# Patient Record
Sex: FEMALE | Race: OTHER | HISPANIC OR LATINO | Employment: UNEMPLOYED | URBAN - METROPOLITAN AREA
[De-identification: names, ages, dates, MRNs, and addresses within clinical notes are randomized per-mention and may not be internally consistent; named-entity substitution may affect disease eponyms.]

---

## 2024-07-22 ENCOUNTER — OFFICE VISIT (OUTPATIENT)
Age: 54
End: 2024-07-22

## 2024-07-22 VITALS
SYSTOLIC BLOOD PRESSURE: 130 MMHG | WEIGHT: 148 LBS | HEIGHT: 64 IN | DIASTOLIC BLOOD PRESSURE: 87 MMHG | TEMPERATURE: 97.3 F | BODY MASS INDEX: 25.27 KG/M2 | OXYGEN SATURATION: 99 % | HEART RATE: 83 BPM

## 2024-07-22 DIAGNOSIS — E78.00 HYPERCHOLESTEROLEMIA: ICD-10-CM

## 2024-07-22 DIAGNOSIS — R10.9 ABDOMINAL PAIN, UNSPECIFIED ABDOMINAL LOCATION: ICD-10-CM

## 2024-07-22 DIAGNOSIS — Z79.60 LONG-TERM USE OF IMMUNOSUPPRESSANT MEDICATION: ICD-10-CM

## 2024-07-22 DIAGNOSIS — R11.0 NAUSEA: ICD-10-CM

## 2024-07-22 DIAGNOSIS — Z86.19 HEPATITIS C VIRUS INFECTION CURED AFTER ANTIVIRAL DRUG THERAPY: ICD-10-CM

## 2024-07-22 DIAGNOSIS — R19.7 DIARRHEA, UNSPECIFIED TYPE: ICD-10-CM

## 2024-07-22 DIAGNOSIS — Z87.898 H/O INTRAVENOUS DRUG USE IN REMISSION: ICD-10-CM

## 2024-07-22 DIAGNOSIS — Z94.4 LIVER TRANSPLANT RECIPIENT (HCC): Primary | ICD-10-CM

## 2024-07-22 RX ORDER — SERTRALINE HYDROCHLORIDE 20 MG/ML
25 SOLUTION ORAL DAILY
COMMUNITY

## 2024-07-22 RX ORDER — ATORVASTATIN CALCIUM 10 MG/1
10 TABLET, FILM COATED ORAL DAILY
COMMUNITY

## 2024-07-22 RX ORDER — BUPRENORPHINE AND NALOXONE 8; 2 MG/1; MG/1
1 FILM, SOLUBLE BUCCAL; SUBLINGUAL DAILY
COMMUNITY

## 2024-07-22 RX ORDER — LISINOPRIL 10 MG/1
10 TABLET ORAL DAILY
COMMUNITY

## 2024-07-22 RX ORDER — PREDNISONE 5 MG/1
5 TABLET ORAL DAILY
COMMUNITY

## 2024-07-22 RX ORDER — MYCOPHENOLATE MOFETIL 500 MG/1
TABLET ORAL 2 TIMES DAILY
COMMUNITY

## 2024-07-22 RX ORDER — BUPRENORPHINE AND NALOXONE 4; 1 MG/1; MG/1
1 FILM, SOLUBLE BUCCAL; SUBLINGUAL DAILY
COMMUNITY

## 2024-07-22 RX ORDER — TACROLIMUS 1 MG/1
1 CAPSULE ORAL 2 TIMES DAILY
COMMUNITY

## 2024-07-22 NOTE — PROGRESS NOTES
40 mg, Oral, DAILY    polyethylene glycol (GLYCOLAX) 17 GM/SCOOP powder Take as a split dose as directed.    predniSONE (DELTASONE) 5 mg, Oral, DAILY    sertraline (ZOLOFT) 25 mg, Oral, DAILY    sodium-potassium-mag sulfate (SUPREP) 17.5-3.13-1.6 GM/177ML SOLN solution Follow directions provided by physician's office.    tacrolimus (PROGRAF) 1 mg, Oral, 2 TIMES DAILY    valACYclovir (VALTREX) 1,000 mg, Oral, PRN       SYSTEM REVIEW NOT RELATED TO LIVER DISEASE OR REVIEWED ABOVE:  Constitution systems: Negative for fever, chills, weight gain, weight loss.   Eyes: Negative for visual changes.  ENT: Negative for sore throat, painful swallowing.   Respiratory: Negative for cough, hemoptysis, SOB.   Cardiology: Negative for chest pain, palpitations.  GI:  Negative for constipation or diarrhea.  : Negative for urinary frequency, dysuria, hematuria, nocturia.   Skin: Negative for rash.  Hematology: Negative for easy bruising, blood clots.    Musculo-skelatal: Negative for back pain, muscle pain, weakness.  Neurologic: Negative for headaches, dizziness, vertigo, memory problems not related to HE.  Psychology: Negative for anxiety, depression.       FAMILY HISTORY:  There is no family history of liver disease.      SOCIAL HISTORY:  The patient is .    The patient has children and grandchildren  The patient stopped using tobacco products in 2010.    The patient has never consumed significant amounts of alcohol.    The patient does not work outside the home.        PHYSICAL EXAMINATION:  /87 (Site: Left Upper Arm, Position: Sitting, Cuff Size: Medium Adult)   Pulse 83   Temp 97.3 °F (36.3 °C)   Ht 1.626 m (5' 4\")   Wt 67.1 kg (148 lb)   SpO2 99%   BMI 25.40 kg/m²     General: No acute distress.   Eyes: Sclera anicteric.   ENT: No oral lesions.  Thyroid normal.  Nodes: No adenopathy.   Skin: No spider angiomata.  No jaundice.  No palmar erythema.  Respiratory: Lungs clear to auscultation.   Cardiovascular:

## 2024-07-23 ENCOUNTER — HOSPITAL ENCOUNTER (OUTPATIENT)
Facility: HOSPITAL | Age: 54
Setting detail: SPECIMEN
Discharge: HOME | End: 2024-07-26
Payer: COMMERCIAL

## 2024-07-23 ENCOUNTER — PREP FOR PROCEDURE (OUTPATIENT)
Age: 54
End: 2024-07-23

## 2024-07-23 ENCOUNTER — TELEPHONE (OUTPATIENT)
Age: 54
End: 2024-07-23

## 2024-07-23 DIAGNOSIS — Z94.4 LIVER TRANSPLANT RECIPIENT (HCC): Primary | ICD-10-CM

## 2024-07-23 DIAGNOSIS — Z94.4 LIVER TRANSPLANT RECIPIENT (HCC): ICD-10-CM

## 2024-07-23 DIAGNOSIS — R19.7 DIARRHEA, UNSPECIFIED TYPE: ICD-10-CM

## 2024-07-23 DIAGNOSIS — R11.0 NAUSEA: ICD-10-CM

## 2024-07-23 DIAGNOSIS — R10.9 ABDOMINAL PAIN, UNSPECIFIED ABDOMINAL LOCATION: ICD-10-CM

## 2024-07-23 PROCEDURE — 80197 ASSAY OF TACROLIMUS: CPT

## 2024-07-23 PROCEDURE — 82378 CARCINOEMBRYONIC ANTIGEN: CPT

## 2024-07-23 PROCEDURE — 87522 HEPATITIS C REVRS TRNSCRPJ: CPT

## 2024-07-23 PROCEDURE — 36415 COLL VENOUS BLD VENIPUNCTURE: CPT

## 2024-07-23 PROCEDURE — 86301 IMMUNOASSAY TUMOR CA 19-9: CPT

## 2024-07-23 PROCEDURE — 43235 EGD DIAGNOSTIC BRUSH WASH: CPT | Performed by: INTERNAL MEDICINE

## 2024-07-24 LAB — CEA SERPL-MCNC: 0.4 NG/ML

## 2024-07-26 DIAGNOSIS — R10.9 ABDOMINAL PAIN, UNSPECIFIED ABDOMINAL LOCATION: ICD-10-CM

## 2024-07-26 DIAGNOSIS — R11.0 NAUSEA: ICD-10-CM

## 2024-07-26 DIAGNOSIS — Z94.4 LIVER TRANSPLANT RECIPIENT (HCC): ICD-10-CM

## 2024-07-26 DIAGNOSIS — R19.7 DIARRHEA, UNSPECIFIED TYPE: ICD-10-CM

## 2024-07-26 LAB
CANCER AG19-9 SERPL-ACNC: 11 U/ML (ref 0–35)
HCV RNA SERPL NAA+PROBE-LOG IU: NOT DETECTED HCV LOG 10IU/ML
HCV RNA SERPL PROBE AMP-ACNC: NOT DETECTED HCVIU/ML
TACROLIMUS BLD-MCNC: 9.7 NG/ML (ref 2–20)

## 2024-07-29 ENCOUNTER — ANESTHESIA (OUTPATIENT)
Facility: HOSPITAL | Age: 54
End: 2024-07-29
Payer: COMMERCIAL

## 2024-07-29 ENCOUNTER — HOSPITAL ENCOUNTER (OUTPATIENT)
Facility: HOSPITAL | Age: 54
Setting detail: OUTPATIENT SURGERY
Discharge: HOME | End: 2024-07-29
Attending: INTERNAL MEDICINE | Admitting: INTERNAL MEDICINE
Payer: COMMERCIAL

## 2024-07-29 ENCOUNTER — ANESTHESIA EVENT (OUTPATIENT)
Facility: HOSPITAL | Age: 54
End: 2024-07-29
Payer: COMMERCIAL

## 2024-07-29 VITALS
WEIGHT: 151.1 LBS | SYSTOLIC BLOOD PRESSURE: 120 MMHG | HEIGHT: 64 IN | DIASTOLIC BLOOD PRESSURE: 85 MMHG | TEMPERATURE: 97.7 F | RESPIRATION RATE: 18 BRPM | BODY MASS INDEX: 25.8 KG/M2 | HEART RATE: 68 BPM | OXYGEN SATURATION: 100 %

## 2024-07-29 LAB
GLUCOSE BLD STRIP.AUTO-MCNC: 118 MG/DL (ref 70–110)
HCG UR QL: NEGATIVE

## 2024-07-29 PROCEDURE — 81025 URINE PREGNANCY TEST: CPT

## 2024-07-29 PROCEDURE — 7100000010 HC PHASE II RECOVERY - FIRST 15 MIN: Performed by: INTERNAL MEDICINE

## 2024-07-29 PROCEDURE — 7100000011 HC PHASE II RECOVERY - ADDTL 15 MIN: Performed by: INTERNAL MEDICINE

## 2024-07-29 PROCEDURE — 43235 EGD DIAGNOSTIC BRUSH WASH: CPT | Performed by: INTERNAL MEDICINE

## 2024-07-29 PROCEDURE — 2580000003 HC RX 258: Performed by: INTERNAL MEDICINE

## 2024-07-29 PROCEDURE — 6360000002 HC RX W HCPCS: Performed by: NURSE ANESTHETIST, CERTIFIED REGISTERED

## 2024-07-29 PROCEDURE — 3700000001 HC ADD 15 MINUTES (ANESTHESIA): Performed by: INTERNAL MEDICINE

## 2024-07-29 PROCEDURE — 2709999900 HC NON-CHARGEABLE SUPPLY: Performed by: INTERNAL MEDICINE

## 2024-07-29 PROCEDURE — 3600007512: Performed by: INTERNAL MEDICINE

## 2024-07-29 PROCEDURE — 82962 GLUCOSE BLOOD TEST: CPT

## 2024-07-29 PROCEDURE — 3700000000 HC ANESTHESIA ATTENDED CARE: Performed by: INTERNAL MEDICINE

## 2024-07-29 PROCEDURE — 3600007502: Performed by: INTERNAL MEDICINE

## 2024-07-29 PROCEDURE — 2500000003 HC RX 250 WO HCPCS: Performed by: NURSE ANESTHETIST, CERTIFIED REGISTERED

## 2024-07-29 RX ORDER — LIDOCAINE HYDROCHLORIDE 20 MG/ML
INJECTION, SOLUTION EPIDURAL; INFILTRATION; INTRACAUDAL; PERINEURAL PRN
Status: DISCONTINUED | OUTPATIENT
Start: 2024-07-29 | End: 2024-07-29 | Stop reason: SDUPTHER

## 2024-07-29 RX ORDER — SODIUM CHLORIDE 9 MG/ML
INJECTION, SOLUTION INTRAVENOUS CONTINUOUS
Status: DISCONTINUED | OUTPATIENT
Start: 2024-07-29 | End: 2024-07-29 | Stop reason: HOSPADM

## 2024-07-29 RX ORDER — PROPOFOL 10 MG/ML
INJECTION, EMULSION INTRAVENOUS PRN
Status: DISCONTINUED | OUTPATIENT
Start: 2024-07-29 | End: 2024-07-29 | Stop reason: SDUPTHER

## 2024-07-29 RX ADMIN — PROPOFOL 50 MG: 10 INJECTION, EMULSION INTRAVENOUS at 11:32

## 2024-07-29 RX ADMIN — SODIUM CHLORIDE: 9 INJECTION, SOLUTION INTRAVENOUS at 11:15

## 2024-07-29 RX ADMIN — LIDOCAINE HYDROCHLORIDE 60 MG: 20 INJECTION, SOLUTION EPIDURAL; INFILTRATION; INTRACAUDAL; PERINEURAL at 11:29

## 2024-07-29 RX ADMIN — PROPOFOL 50 MG: 10 INJECTION, EMULSION INTRAVENOUS at 11:31

## 2024-07-29 RX ADMIN — PROPOFOL 60 MG: 10 INJECTION, EMULSION INTRAVENOUS at 11:29

## 2024-07-29 RX ADMIN — PROPOFOL 40 MG: 10 INJECTION, EMULSION INTRAVENOUS at 11:30

## 2024-07-29 ASSESSMENT — PAIN - FUNCTIONAL ASSESSMENT: PAIN_FUNCTIONAL_ASSESSMENT: 0-10

## 2024-07-29 NOTE — H&P
[facility]      Jerry Larios MD, FACP, FACG, FAASLD      MELISSA Adkins, [facility]   Helena Valentinrobin, [facility]   Griseldamo Cotter, [facility]  Mahendra Kay, [facility]   Shabnam Maciel, [facility]   Flower Aston, [facility]   [address], [address]   [place]  [address] [phone]   FAX: [phone]  [facility]   [address], [address]   [place]  [address] [phone]   FAX: [phone]         PRE-PROCEDURE NOTE - EGD    H and P from last office visit reviewed.    Allergies reviewed.  Out-patient medicaton list reviewed.    Patient Active Problem List   Diagnosis    Liver transplant recipient (HCC)         No Known Allergies    No current facility-administered medications on file prior to encounter.     Current Outpatient Medications on File Prior to Encounter   Medication Sig Dispense Refill    predniSONE (DELTASONE) 5 MG tablet Take 1 tablet by mouth daily      lisinopril (PRINIVIL;ZESTRIL) 10 MG tablet Take 1 tablet by mouth daily      buprenorphine-naloxone (SUBOXONE) 8-2 MG FILM SL film Place 1 Film under the tongue daily. Max Daily Amount: 1 Film      buprenorphine-naloxone (SUBOXONE) 4-1 MG FILM SL film Place 1 Film under the tongue daily. Max Daily Amount: 1 Film      atorvastatin (LIPITOR) 10 MG tablet Take 1 tablet by mouth daily      tacrolimus (PROGRAF) 1 MG capsule Take 1 capsule by mouth 2 times daily      sertraline (ZOLOFT) 20 MG/ML concentrated solution Take 1.25 mLs by mouth daily      naloxone 4 MG/0.1ML LIQD nasal spray 1 spray by Nasal route as needed for Opioid Reversal      mycophenolate (CELLCEPT) 500 MG tablet Take by mouth 2 times daily         For EGD to assess for etiology of nausea and abdominal pain.        PHYSICAL EXAMINATION:  Vital

## 2024-07-29 NOTE — DISCHARGE INSTRUCTIONS
[facility]      Jerry Larios MD, FACP, FACG, FAASLD      MELISSA Adkins, [facility]   Helena Valentinadesharona, [facility]   Griselda Vahe, [facility]  Mahendra Kay, [facility]   Shabnam Maciel, [facility]   Flower Sorianoon, [facility]   [address], [address]   [place]  [address] [phone]   FAX: [phone]  [facility]   [address], [address]   [place]  [address] [phone]   FAX: [phone]         ENDOSCOPY DISCHARGE INSTRUCTIONS      Nohemy Elena Rodriguez  1970  Date: 7/29/2024    DISCOMFORT:  Use lozenges or warm salt water gargle for sore thoat  Apply warm compress to IV site if red.  If redness or soreness persists call the office.  You may experience gas and bloating.  Walking and belching will help relieve this.  You may experience chest discomfort or pressure especially if banding of esophageal varices was performed.    DIET:  You may resume your normal diet.    ACTIVITY:  Spend the remainder of the day resting.  Avoid any strenuous activity.  You may not operate a vehicle for 12 hours.  You may not engage in an occupation involving machinery or appliances for rest of today.   Avoid making any critical or financial decisions for at least 24 hour.    Call the Liver [facility] Office if you have any of the following:  Increasing chest or abdominal pain, nausea, vomiting, vomiting blood, abdominal distension or swelling, fever or chills, bloody discharge from nose or mouth or shortness of breath.    Follow-up Instructions:  Call Dr. Larios for any questions or problems at the phone number listed above.  If a biopsy was performed, you will be contacted by the office staff or Dr Larios within 1 week.   If you have not heard from us by then you may

## 2024-07-29 NOTE — PERIOP NOTE
TRANSFER - IN REPORT:    Verbal report received from Cari persaud on Nohemy Rodriguez  being received from PROCEDURE ROOM for routine progression of patient care      Report consisted of patient's Situation, Background, Assessment and   Recommendations(SBAR).     Information from the following report(s) Nurse Handoff Report, Intake/Output, MAR, Recent Results, and Med Rec Status was reviewed with the receiving nurse.    Opportunity for questions and clarification was provided.      Assessment completed upon patient's arrival to unit and care assumed.

## 2024-07-29 NOTE — OP NOTE
tolerated the procedure well.    EBL: Negligible.           RECOMMENDATIONS:  Observe until discharge parameters are achieved.  May resume previous diet.  Repeat endoscopy to reassess varices and need for banding in 3 years.  Follow-up Liver [facility] office as scheduled.      Jerry Larios MD  [address], [address]  [place]  [address] [phone]  [facility]

## 2024-07-29 NOTE — ANESTHESIA PRE PROCEDURE
\"CREATININE\", \"GFRAA\", \"AGRATIO\", \"LABGLOM\", \"GLUCOSE\", \"GLU\", \"CALCIUM\", \"BILITOT\", \"ALKPHOS\", \"AST\", \"ALT\"    POC Tests: No results for input(s): \"POCGLU\", \"POCNA\", \"POCK\", \"POCCL\", \"POCBUN\", \"POCHEMO\", \"POCHCT\" in the last 72 hours.    Coags: No results found for: \"PROTIME\", \"INR\", \"APTT\"    HCG (If Applicable): No results found for: \"PREGTESTUR\", \"PREGSERUM\", \"HCG\", \"HCGQUANT\"     ABGs: No results found for: \"PHART\", \"PO2ART\", \"TVD8QEP\", \"XQK8GDN\", \"BEART\", \"Q0KFPDOL\"     Type & Screen (If Applicable):  No results found for: \"LABABO\"    Drug/Infectious Status (If Applicable):  No results found for: \"HIV\", \"HEPCAB\"    COVID-19 Screening (If Applicable): No results found for: \"COVID19\"        Anesthesia Evaluation  Patient summary reviewed and Nursing notes reviewed  Airway: Mallampati: I  TM distance: >3 FB   Neck ROM: full  Mouth opening: > = 3 FB   Dental:    (+) upper dentures and partials      Pulmonary:Negative Pulmonary ROS and normal exam  breath sounds clear to auscultation                             Cardiovascular:  Exercise tolerance: good (>4 METS)  (+) hypertension: no interval change        Rhythm: regular  Rate: normal           Beta Blocker:  Not on Beta Blocker         Neuro/Psych:   Negative Neuro/Psych ROS              GI/Hepatic/Renal:   (+) liver disease:         ROS comment: Cirrhosis. S/p liver transplant.   Endo/Other:    (+) DiabetesType II DM, well controlled.                  ROS comment: Hx of heroin abuse, clean 22 years, on suboxone last dose this morning Abdominal: normal exam            Vascular: negative vascular ROS.         Other Findings:             Anesthesia Plan      MAC     ASA 3         continuous noninvasive hemodynamic monitor    Anesthetic plan and risks discussed with patient.      Plan discussed with CRNA.                    Reinaldo Kim MD   7/29/2024

## 2024-07-30 NOTE — ANESTHESIA POSTPROCEDURE EVALUATION
Department of Anesthesiology  Postprocedure Note    Patient: Nohemy Rodriguez  MRN: [id number]  YOB: 1970  Date of evaluation: 7/30/2024    Procedure Summary       Date: 07/29/24 Room / Location: [place] [address] / [facility] ENDOSCOPY    Anesthesia Start: 1123 Anesthesia Stop: 1136    Procedure: ESOPHAGOGASTRODUODENOSCOPY (Upper GI Region) Diagnosis:       Liver transplant recipient (HCC)      (Liver transplant recipient (HCC) [Z94.4])    Surgeons: Jerry Larios MD Responsible Provider: Reinaldo Kim MD    Anesthesia Type: MAC ASA Status: 3            Anesthesia Type: MAC    Lg Phase I: Lg Score: 10    Lg Phase II: Lg Score: 10    Anesthesia Post Evaluation    Patient location during evaluation: PACU  Patient participation: complete - patient participated  Level of consciousness: awake and alert  Airway patency: patent  Nausea & Vomiting: no nausea and no vomiting  Cardiovascular status: blood pressure returned to baseline and hemodynamically stable  Respiratory status: spontaneous ventilation, room air and nonlabored ventilation  Hydration status: stable  Pain management: adequate and satisfactory to patient        No notable events documented.

## 2024-08-05 ENCOUNTER — HOSPITAL ENCOUNTER (OUTPATIENT)
Facility: HOSPITAL | Age: 54
Discharge: HOME | End: 2024-08-08
Attending: INTERNAL MEDICINE
Payer: COMMERCIAL

## 2024-08-05 ENCOUNTER — HOSPITAL ENCOUNTER (OUTPATIENT)
Facility: HOSPITAL | Age: 54
Setting detail: SPECIMEN
Discharge: HOME | End: 2024-08-08
Payer: COMMERCIAL

## 2024-08-05 DIAGNOSIS — R10.9 ABDOMINAL PAIN, UNSPECIFIED ABDOMINAL LOCATION: ICD-10-CM

## 2024-08-05 DIAGNOSIS — R19.7 DIARRHEA, UNSPECIFIED TYPE: ICD-10-CM

## 2024-08-05 DIAGNOSIS — Z94.4 LIVER TRANSPLANT RECIPIENT (HCC): ICD-10-CM

## 2024-08-05 DIAGNOSIS — R11.0 NAUSEA: ICD-10-CM

## 2024-08-05 LAB
ALBUMIN SERPL-MCNC: 4 G/DL (ref 3.4–5)
ALBUMIN/GLOB SERPL: 1.2 (ref 0.8–1.7)
ALP SERPL-CCNC: 61 U/L (ref 45–117)
ALT SERPL-CCNC: 34 U/L (ref 13–56)
ANION GAP SERPL CALC-SCNC: 6 MMOL/L (ref 3–18)
AST SERPL-CCNC: 27 U/L (ref 10–38)
BASOPHILS # BLD: 0 K/UL (ref 0–0.1)
BASOPHILS NFR BLD: 0 % (ref 0–2)
BILIRUB DIRECT SERPL-MCNC: 0.3 MG/DL (ref 0–0.2)
BILIRUB SERPL-MCNC: 0.8 MG/DL (ref 0.2–1)
BUN SERPL-MCNC: 11 MG/DL (ref 7–18)
BUN/CREAT SERPL: 19 (ref 12–20)
CALCIUM SERPL-MCNC: 9.6 MG/DL (ref 8.5–10.1)
CHLORIDE SERPL-SCNC: 105 MMOL/L (ref 100–111)
CO2 SERPL-SCNC: 26 MMOL/L (ref 21–32)
CREAT SERPL-MCNC: 0.58 MG/DL (ref 0.6–1.3)
DIFFERENTIAL METHOD BLD: ABNORMAL
EOSINOPHIL # BLD: 0 K/UL (ref 0–0.4)
EOSINOPHIL NFR BLD: 0 % (ref 0–5)
ERYTHROCYTE [DISTWIDTH] IN BLOOD BY AUTOMATED COUNT: 12 % (ref 11.6–14.5)
GLOBULIN SER CALC-MCNC: 3.3 G/DL (ref 2–4)
GLUCOSE SERPL-MCNC: 123 MG/DL (ref 74–99)
HCT VFR BLD AUTO: 33.6 % (ref 35–45)
HGB BLD-MCNC: 11.7 G/DL (ref 12–16)
IMM GRANULOCYTES # BLD AUTO: 0 K/UL (ref 0–0.04)
IMM GRANULOCYTES NFR BLD AUTO: 1 % (ref 0–0.5)
LYMPHOCYTES # BLD: 1.2 K/UL (ref 0.9–3.6)
LYMPHOCYTES NFR BLD: 14 % (ref 21–52)
MAGNESIUM SERPL-MCNC: 1.7 MG/DL (ref 1.6–2.6)
MCH RBC QN AUTO: 31.1 PG (ref 24–34)
MCHC RBC AUTO-ENTMCNC: 34.8 G/DL (ref 31–37)
MCV RBC AUTO: 89.4 FL (ref 78–100)
MONOCYTES # BLD: 0.3 K/UL (ref 0.05–1.2)
MONOCYTES NFR BLD: 3 % (ref 3–10)
NEUTS SEG # BLD: 7 K/UL (ref 1.8–8)
NEUTS SEG NFR BLD: 82 % (ref 40–73)
NRBC # BLD: 0 K/UL (ref 0–0.01)
NRBC BLD-RTO: 0 PER 100 WBC
PLATELET # BLD AUTO: 268 K/UL (ref 135–420)
PMV BLD AUTO: 10.7 FL (ref 9.2–11.8)
POTASSIUM SERPL-SCNC: 4.4 MMOL/L (ref 3.5–5.5)
PROT SERPL-MCNC: 7.3 G/DL (ref 6.4–8.2)
RBC # BLD AUTO: 3.76 M/UL (ref 4.2–5.3)
SODIUM SERPL-SCNC: 137 MMOL/L (ref 136–145)
WBC # BLD AUTO: 8.6 K/UL (ref 4.6–13.2)

## 2024-08-05 PROCEDURE — 80197 ASSAY OF TACROLIMUS: CPT

## 2024-08-05 PROCEDURE — 71260 CT THORAX DX C+: CPT

## 2024-08-05 PROCEDURE — 6360000004 HC RX CONTRAST MEDICATION: Performed by: INTERNAL MEDICINE

## 2024-08-05 PROCEDURE — 80076 HEPATIC FUNCTION PANEL: CPT

## 2024-08-05 PROCEDURE — 85025 COMPLETE CBC W/AUTO DIFF WBC: CPT

## 2024-08-05 PROCEDURE — 74178 CT ABD&PLV WO CNTR FLWD CNTR: CPT

## 2024-08-05 PROCEDURE — 36415 COLL VENOUS BLD VENIPUNCTURE: CPT

## 2024-08-05 PROCEDURE — 74170 CT ABD WO CNTRST FLWD CNTRST: CPT

## 2024-08-05 PROCEDURE — 83735 ASSAY OF MAGNESIUM: CPT

## 2024-08-05 PROCEDURE — 80048 BASIC METABOLIC PNL TOTAL CA: CPT

## 2024-08-05 RX ADMIN — IOPAMIDOL 100 ML: 612 INJECTION, SOLUTION INTRAVENOUS at 13:12

## 2024-08-07 LAB — TACROLIMUS BLD-MCNC: 11.3 NG/ML (ref 2–20)

## 2024-08-13 RX ORDER — BISACODYL 5 MG/1
TABLET, DELAYED RELEASE ORAL
COMMUNITY
Start: 2024-08-02

## 2024-08-13 RX ORDER — POLYETHYLENE GLYCOL 3350 17 G/17G
POWDER, FOR SOLUTION ORAL
COMMUNITY
Start: 2024-08-02

## 2024-08-14 ENCOUNTER — OFFICE VISIT (OUTPATIENT)
Age: 54
End: 2024-08-14

## 2024-08-14 VITALS
OXYGEN SATURATION: 98 % | WEIGHT: 153 LBS | TEMPERATURE: 98.3 F | HEIGHT: 64 IN | BODY MASS INDEX: 26.12 KG/M2 | HEART RATE: 84 BPM | SYSTOLIC BLOOD PRESSURE: 152 MMHG | DIASTOLIC BLOOD PRESSURE: 97 MMHG

## 2024-08-14 DIAGNOSIS — Z94.4 LIVER TRANSPLANT RECIPIENT (HCC): Primary | ICD-10-CM

## 2024-08-14 NOTE — PROGRESS NOTES
clear to auscultation.   Cardiovascular: Regular heart rate.  No murmurs.  No JVD.  Abdomen: diffuse tenderss.  NO rebound.    Extremities: No edema.    Neurologic: Alert and oriented.  Cranial nerves grossly intact.      LABORATORY STUDIES:   Latest Ref Rn 8/5/2024   JIMENA - Routine Labs     WBC 4.6 - 13.2 K/uL 8.6    ANC 1.8 - 8.0 K/UL 7.0    HGB 12.0 - 16.0 g/dL 11.7 (L)     - 420 K/uL 268    AST 10 - 38 U/L 27    ALT 13 - 56 U/L 34    Alk Phos 45 - 117 U/L 61    Bili, Total 0.2 - 1.0 MG/DL 0.8    Bili, Direct 0.0 - 0.2 MG/DL 0.3 (H)    Albumin 3.4 - 5.0 g/dL 4.0    BUN 7.0 - 18 MG/DL 11    Creat 0.6 - 1.3 MG/DL 0.58 (L)    Na 136 - 145 mmol/L 137    K 3.5 - 5.5 mmol/L 4.4    Cl 100 - 111 mmol/L 105    CO2 21 - 32 mmol/L 26    Glucose 74 - 99 mg/dL 123 (H)    Magnesium 1.6 - 2.6 mg/dL 1.7       Latest Ref Rng 7/23/2024 8/5/2024   JIMENA - Transplant Immune Suppression      Tacrolimus 2.0 - 20.0 ng/mL 9.7  11.3        SEROLOGIES:   Latest Ref [facility] 7/23/2024   JIMENA - Cancer Screening     CA 19-9 0 - 35 U/mL 11    CEA ng/mL 0.4       Latest Ref [facility] 7/23/2024   JIMENA - Serologies     Hep C RNA Donnell Log NOTD HCV log 10IU/Ml Not detected        LIVER HISTOLOGY:  Not available or performed    ENDOSCOPIC PROCEDURES:  7/2024.  EGD by MLS.  Normal    RADIOLOGY:  8/2024.  CT scan chest, abdomen, pelvis with and without IV contrast.  Normal appearing liver.  No liver mass lesions.  Normal spleen.  No ascites.    OTHER TESTING:  Not available or performed    FOLLOW-UP:  All of the issues listed above in the Assessment and Plan were discussed with the patient.  All questions were answered.  The patient expressed a clear understanding of the above.    Laboratory studies should be performed every 1 month for a few times to make sure liver chemistries remain normal in this new setting.  Can then extend monitoring to every 3 months.      Follow-up Liver [facility] 4 weeks to review results of CT scan, labs and to perform

## 2024-08-17 PROBLEM — Z79.60 LONG-TERM USE OF IMMUNOSUPPRESSANT MEDICATION: Status: ACTIVE | Noted: 2024-08-17

## 2024-08-17 PROBLEM — Z87.898 H/O INTRAVENOUS DRUG USE IN REMISSION: Status: ACTIVE | Noted: 2024-08-17

## 2024-08-17 PROBLEM — T86.40 LIVER TRANSPLANT COMPLICATION (HCC): Status: ACTIVE | Noted: 2024-08-17

## 2024-08-17 PROBLEM — I10 HYPERTENSION: Status: ACTIVE | Noted: 2024-08-17

## 2024-08-17 PROBLEM — E78.00 HYPERCHOLESTEROLEMIA: Status: ACTIVE | Noted: 2024-08-17

## 2024-08-17 PROBLEM — Z86.19 HEPATITIS C VIRUS INFECTION CURED AFTER ANTIVIRAL DRUG THERAPY: Status: ACTIVE | Noted: 2024-08-17

## 2024-09-06 ENCOUNTER — TELEPHONE (OUTPATIENT)
Age: 54
End: 2024-09-06

## 2024-09-06 DIAGNOSIS — R11.0 NAUSEA: ICD-10-CM

## 2024-09-06 DIAGNOSIS — Z94.4 LIVER TRANSPLANT RECIPIENT (HCC): Primary | ICD-10-CM

## 2024-09-06 DIAGNOSIS — Z79.60 LONG-TERM USE OF IMMUNOSUPPRESSANT MEDICATION: ICD-10-CM

## 2024-09-06 RX ORDER — ONDANSETRON 4 MG/1
4 TABLET, ORALLY DISINTEGRATING ORAL EVERY 8 HOURS PRN
Qty: 270 TABLET | Refills: 0 | Status: SHIPPED | OUTPATIENT
Start: 2024-09-06 | End: 2024-12-05

## 2024-09-06 RX ORDER — PREDNISONE 2.5 MG/1
2.5 TABLET ORAL DAILY
Qty: 30 TABLET | Refills: 11 | Status: SHIPPED | OUTPATIENT
Start: 2024-09-06 | End: 2025-09-06

## 2024-09-06 NOTE — TELEPHONE ENCOUNTER
Called patient regarding her Tacrolimus dose and Prednisone dose. Patient on Tac 2/2, down from 3/3 and Prednisone 5 mg daily. Patient having labs drawn on Monday, 9/9/24 @ [facility]. Patient still having nausea but intermittently and would appreciate a Rx for ondansetron which was sent.   Informed patient Dr. Larios wants to wean her off the prednisone 5 mg daily to 2.5 mg daily x 2 months, Rx sent.  Requested patient stop the 5 mg prednisone and start the 2.5 mg prednisone tomorrow. Until November 7th. Patient to contact me prior to stopping the prednisone in November.

## 2024-09-11 ENCOUNTER — HOSPITAL ENCOUNTER (OUTPATIENT)
Facility: HOSPITAL | Age: 54
Setting detail: SPECIMEN
Discharge: HOME | End: 2024-09-14
Payer: COMMERCIAL

## 2024-09-11 DIAGNOSIS — Z94.4 LIVER TRANSPLANT RECIPIENT (HCC): ICD-10-CM

## 2024-09-11 PROCEDURE — 80197 ASSAY OF TACROLIMUS: CPT

## 2024-09-11 PROCEDURE — 36415 COLL VENOUS BLD VENIPUNCTURE: CPT

## 2024-09-13 LAB — TACROLIMUS BLD-MCNC: 3.9 NG/ML (ref 2–20)

## 2024-09-24 DIAGNOSIS — Z94.4 LIVER TRANSPLANT RECIPIENT (HCC): ICD-10-CM

## 2024-09-24 DIAGNOSIS — Z79.60 LONG-TERM USE OF IMMUNOSUPPRESSANT MEDICATION: ICD-10-CM

## 2024-09-24 DIAGNOSIS — R11.0 NAUSEA: ICD-10-CM

## 2024-09-24 RX ORDER — PREDNISONE 2.5 MG/1
2.5 TABLET ORAL DAILY
Qty: 30 TABLET | Refills: 1 | Status: SHIPPED | OUTPATIENT
Start: 2024-09-24 | End: 2024-11-23

## 2024-09-24 RX ORDER — TACROLIMUS 1 MG/1
2 CAPSULE ORAL 2 TIMES DAILY
Qty: 360 CAPSULE | Refills: 3 | Status: SHIPPED | OUTPATIENT
Start: 2024-09-24 | End: 2025-09-24

## 2024-11-27 ENCOUNTER — HOSPITAL ENCOUNTER (OUTPATIENT)
Facility: HOSPITAL | Age: 54
Setting detail: SPECIMEN
Discharge: HOME | End: 2024-11-30
Payer: COMMERCIAL

## 2024-11-27 LAB
ALBUMIN SERPL-MCNC: 4.2 G/DL (ref 3.4–5)
ALBUMIN/GLOB SERPL: 1.3 (ref 0.8–1.7)
ALP SERPL-CCNC: 61 U/L (ref 45–117)
ALT SERPL-CCNC: 26 U/L (ref 13–56)
ANION GAP SERPL CALC-SCNC: 4 MMOL/L (ref 3–18)
AST SERPL-CCNC: 20 U/L (ref 10–38)
BASOPHILS # BLD: 0.1 K/UL (ref 0–0.1)
BASOPHILS NFR BLD: 1 % (ref 0–2)
BILIRUB DIRECT SERPL-MCNC: 0.3 MG/DL (ref 0–0.2)
BILIRUB SERPL-MCNC: 0.9 MG/DL (ref 0.2–1)
BUN SERPL-MCNC: 16 MG/DL (ref 7–18)
BUN/CREAT SERPL: 21 (ref 12–20)
CALCIUM SERPL-MCNC: 9.7 MG/DL (ref 8.5–10.1)
CHLORIDE SERPL-SCNC: 108 MMOL/L (ref 100–111)
CO2 SERPL-SCNC: 30 MMOL/L (ref 21–32)
CREAT SERPL-MCNC: 0.77 MG/DL (ref 0.6–1.3)
DIFFERENTIAL METHOD BLD: ABNORMAL
EOSINOPHIL # BLD: 0 K/UL (ref 0–0.4)
EOSINOPHIL NFR BLD: 0 % (ref 0–5)
ERYTHROCYTE [DISTWIDTH] IN BLOOD BY AUTOMATED COUNT: 12.5 % (ref 11.6–14.5)
GLOBULIN SER CALC-MCNC: 3.2 G/DL (ref 2–4)
GLUCOSE SERPL-MCNC: 134 MG/DL (ref 74–99)
HCT VFR BLD AUTO: 34.3 % (ref 35–45)
HGB BLD-MCNC: 11.6 G/DL (ref 12–16)
IMM GRANULOCYTES # BLD AUTO: 0.1 K/UL (ref 0–0.04)
IMM GRANULOCYTES NFR BLD AUTO: 1 % (ref 0–0.5)
LYMPHOCYTES # BLD: 2.5 K/UL (ref 0.9–3.6)
LYMPHOCYTES NFR BLD: 25 % (ref 21–52)
MAGNESIUM SERPL-MCNC: 2 MG/DL (ref 1.6–2.6)
MCH RBC QN AUTO: 31.7 PG (ref 24–34)
MCHC RBC AUTO-ENTMCNC: 33.8 G/DL (ref 31–37)
MCV RBC AUTO: 93.7 FL (ref 78–100)
MONOCYTES # BLD: 0.6 K/UL (ref 0.05–1.2)
MONOCYTES NFR BLD: 7 % (ref 3–10)
NEUTS SEG # BLD: 6.5 K/UL (ref 1.8–8)
NEUTS SEG NFR BLD: 67 % (ref 40–73)
NRBC # BLD: 0 K/UL (ref 0–0.01)
NRBC BLD-RTO: 0 PER 100 WBC
PLATELET # BLD AUTO: 257 K/UL (ref 135–420)
PMV BLD AUTO: 10.1 FL (ref 9.2–11.8)
POTASSIUM SERPL-SCNC: 4.6 MMOL/L (ref 3.5–5.5)
PROT SERPL-MCNC: 7.4 G/DL (ref 6.4–8.2)
RBC # BLD AUTO: 3.66 M/UL (ref 4.2–5.3)
SODIUM SERPL-SCNC: 142 MMOL/L (ref 136–145)
WBC # BLD AUTO: 9.7 K/UL (ref 4.6–13.2)

## 2024-11-27 PROCEDURE — 80197 ASSAY OF TACROLIMUS: CPT

## 2024-11-27 PROCEDURE — 83735 ASSAY OF MAGNESIUM: CPT

## 2024-11-27 PROCEDURE — 80076 HEPATIC FUNCTION PANEL: CPT

## 2024-11-27 PROCEDURE — 36415 COLL VENOUS BLD VENIPUNCTURE: CPT

## 2024-11-27 PROCEDURE — 80048 BASIC METABOLIC PNL TOTAL CA: CPT

## 2024-11-27 PROCEDURE — 85025 COMPLETE CBC W/AUTO DIFF WBC: CPT

## 2024-11-30 LAB — TACROLIMUS BLD-MCNC: 2.4 NG/ML (ref 2–20)

## 2024-12-02 ENCOUNTER — TELEPHONE (OUTPATIENT)
Age: 54
End: 2024-12-02

## 2024-12-12 ENCOUNTER — TELEPHONE (OUTPATIENT)
Age: 54
End: 2024-12-12

## 2024-12-12 NOTE — TELEPHONE ENCOUNTER
Called patient after receiving a text from her requesting I give her a call. Patient informed me she is having a lot of nausea, loss of appetite and has been taking edibles to increase her appetite which is helping. Patient is a recovering addict, on suboxone and would prefer not to use edibles but was losing too much weight since she was not eating.   Patient stated she is no longer on sertraline and her anxiety his very high. She stopped sertraline when she moved to the area from [place] since she said \"I was taking too many pills.\" Patients daughter  2 years ago and buried her in [place].   Recommended she speak with her PCP, go back on sertraline and she a therapist and/or psychiatrist. Patient verbally confirmed understanding.    Did not refer her to Gastro since Dr. Larios performed a EGD 24 and was normal.  Patient has a f/u appointment with Dr. Larios on 1/15/25

## 2025-04-15 ENCOUNTER — TELEPHONE (OUTPATIENT)
Age: 55
End: 2025-04-15

## 2025-06-11 ENCOUNTER — OFFICE VISIT (OUTPATIENT)
Age: 55
End: 2025-06-11

## 2025-06-11 VITALS
HEART RATE: 80 BPM | HEIGHT: 64 IN | SYSTOLIC BLOOD PRESSURE: 146 MMHG | DIASTOLIC BLOOD PRESSURE: 73 MMHG | OXYGEN SATURATION: 100 % | WEIGHT: 157 LBS | TEMPERATURE: 97.4 F | BODY MASS INDEX: 26.8 KG/M2

## 2025-06-11 DIAGNOSIS — Z94.4 LIVER TRANSPLANT RECIPIENT (HCC): ICD-10-CM

## 2025-06-11 DIAGNOSIS — Z79.60 LONG-TERM USE OF IMMUNOSUPPRESSANT MEDICATION: ICD-10-CM

## 2025-06-11 DIAGNOSIS — Z94.4 LIVER TRANSPLANT RECIPIENT (HCC): Primary | ICD-10-CM

## 2025-06-11 RX ORDER — TACROLIMUS 1 MG/1
2 CAPSULE ORAL 2 TIMES DAILY
Qty: 360 CAPSULE | Refills: 3 | Status: SHIPPED | OUTPATIENT
Start: 2025-06-11 | End: 2026-06-11

## 2025-06-11 RX ORDER — HYDROXYZINE HYDROCHLORIDE 25 MG/1
25 TABLET, FILM COATED ORAL 2 TIMES DAILY PRN
COMMUNITY
Start: 2025-04-24

## 2025-06-11 RX ORDER — DICYCLOMINE HCL 20 MG
20 TABLET ORAL 2 TIMES DAILY PRN
COMMUNITY
Start: 2025-06-08

## 2025-06-11 RX ORDER — MYCOPHENOLATE MOFETIL 500 MG/1
1000 TABLET ORAL 2 TIMES DAILY
Qty: 360 TABLET | Refills: 3 | Status: SHIPPED | OUTPATIENT
Start: 2025-06-11

## 2025-06-11 RX ORDER — ONDANSETRON 4 MG/1
4 TABLET, ORALLY DISINTEGRATING ORAL EVERY 8 HOURS PRN
COMMUNITY
Start: 2025-04-18

## 2025-06-11 RX ORDER — SERTRALINE HYDROCHLORIDE 20 MG/ML
SOLUTION ORAL
COMMUNITY
Start: 2024-11-23

## 2025-06-11 RX ORDER — FLUTICASONE PROPIONATE 50 MCG
1 SPRAY, SUSPENSION (ML) NASAL DAILY
COMMUNITY
Start: 2025-05-08

## 2025-06-11 RX ORDER — MYCOPHENOLATE MOFETIL 500 MG/1
500 TABLET ORAL 2 TIMES DAILY
Qty: 180 TABLET | Refills: 3 | Status: SHIPPED | OUTPATIENT
Start: 2025-06-11 | End: 2025-06-11

## 2025-06-11 RX ORDER — ESCITALOPRAM OXALATE 10 MG/1
TABLET ORAL
COMMUNITY
Start: 2025-04-24

## 2025-06-11 RX ORDER — CETIRIZINE HYDROCHLORIDE 10 MG/1
TABLET ORAL
COMMUNITY
Start: 2025-05-08

## 2025-06-11 NOTE — PROGRESS NOTES
[facility] LIVER [facility]     Jerry Larios MD, FACP, MACG, FAASLD   MD Ann-Marie Chatterjee, PA-CARL Phillips, [facility] [place]   Helena Zhang, [facility]  Mahendra Rahul, FNP-C   Shabnam Janell, [facility] [place]-BC         Liver [facility]   [address], [address]   [place]  [address] [phone]   FAX: [phone]  [facility]   [address], [address]   [place]  [address] [phone]   FAX: [phone]       Patient Care Team:  Yuridia Pham MD as PCP - General (Internal Medicine)  [facility], Deshawn GARCIA MD (Gastroenterology)      Patient Active Problem List   Diagnosis    Liver transplant recipient (HCC)    Long-term use of immunosuppressant medication    Liver transplant complication (HCC)    Hepatitis C virus infection cured after antiviral drug therapy    Hypertension    H/O intravenous drug use in remission    Hypercholesterolemia       Nohemy Rodriguez returns to the Liver [facility] for management of liver transplant graft function, to monitor and adjust immune suppression and to assess for recurrence of the primary liver disease.      The active problem list, all pertinent past medical history, medications, radiologic findings and laboratory findings related to the liver disorder were reviewed with the patient.      The patient is a 54 y.o. female who underwent a liver transplant in [place], [place] in 10/2012.  The etiology for cirrhosis was chronic HCV.  HCV was treated with Epclusa following the LT and she achieved SVR/cure.    The patient is taking the following for immune suppression: Tacrolimus, cellcept, prednisone    She has not utilized IV drugs since 2001 and remains on suboxone.    Assessment of liver fibrosis with Fibroscan was performed in the office today.  The result was

## 2025-07-10 DIAGNOSIS — Z79.60 LONG-TERM USE OF IMMUNOSUPPRESSANT MEDICATION: ICD-10-CM

## 2025-07-10 DIAGNOSIS — Z94.4 LIVER TRANSPLANT RECIPIENT (HCC): ICD-10-CM

## 2025-07-13 RX ORDER — TACROLIMUS 1 MG/1
CAPSULE ORAL
Qty: 540 CAPSULE | Refills: 3 | Status: SHIPPED | OUTPATIENT
Start: 2025-07-13

## 2025-07-30 ENCOUNTER — TRANSCRIBE ORDERS (OUTPATIENT)
Facility: HOSPITAL | Age: 55
End: 2025-07-30

## 2025-07-30 DIAGNOSIS — Z94.4 LIVER REPLACED BY TRANSPLANT (HCC): Primary | ICD-10-CM

## (undated) DEVICE — ENDO CARRY-ON PROCEDURE KIT INCLUDES ENZYMATIC SPONGE, GAUZE, BIOHAZARD LABEL, TRAY, LUBRICANT, DIRTY SCOPE LABEL, WATER LABEL, TRAY, DRAWSTRING PAD, AND DEFENDO 4-PIECE KIT.: Brand: ENDO CARRY-ON PROCEDURE KIT

## (undated) DEVICE — TUBING SUCT L12FT DIA0.25IN CLR W/ MAXI-GRIP AND M/M CONN

## (undated) DEVICE — MOUTHPIECE ENDOSCP L CTRL OPN AND SIDE PORTS DISP

## (undated) DEVICE — KENDALL RADIOLUCENT FOAM MONITORING ELECTRODE RECTANGULAR SHAPE: Brand: KENDALL